# Patient Record
Sex: FEMALE | Race: ASIAN | NOT HISPANIC OR LATINO
[De-identification: names, ages, dates, MRNs, and addresses within clinical notes are randomized per-mention and may not be internally consistent; named-entity substitution may affect disease eponyms.]

---

## 2024-02-17 ENCOUNTER — NON-APPOINTMENT (OUTPATIENT)
Age: 49
End: 2024-02-17

## 2024-02-20 PROBLEM — Z00.00 ENCOUNTER FOR PREVENTIVE HEALTH EXAMINATION: Status: ACTIVE | Noted: 2024-02-20

## 2024-02-22 ENCOUNTER — APPOINTMENT (OUTPATIENT)
Dept: ORTHOPEDIC SURGERY | Facility: CLINIC | Age: 49
End: 2024-02-22
Payer: COMMERCIAL

## 2024-02-22 VITALS — HEIGHT: 63 IN | WEIGHT: 186 LBS | BODY MASS INDEX: 32.96 KG/M2

## 2024-02-22 DIAGNOSIS — I10 ESSENTIAL (PRIMARY) HYPERTENSION: ICD-10-CM

## 2024-02-22 DIAGNOSIS — M17.11 UNILATERAL PRIMARY OSTEOARTHRITIS, RIGHT KNEE: ICD-10-CM

## 2024-02-22 DIAGNOSIS — M79.18 MYALGIA, OTHER SITE: ICD-10-CM

## 2024-02-22 DIAGNOSIS — M71.21 SYNOVIAL CYST OF POPLITEAL SPACE [BAKER], RIGHT KNEE: ICD-10-CM

## 2024-02-22 PROCEDURE — 20610 DRAIN/INJ JOINT/BURSA W/O US: CPT | Mod: RT

## 2024-02-22 PROCEDURE — 73564 X-RAY EXAM KNEE 4 OR MORE: CPT | Mod: RT

## 2024-02-22 PROCEDURE — 99204 OFFICE O/P NEW MOD 45 MIN: CPT | Mod: 25

## 2024-02-22 PROCEDURE — J3490M: CUSTOM

## 2024-02-22 NOTE — ASSESSMENT
[FreeTextEntry1] : Right X-Ray Examination of the KNEE (4 views): medial and patellofemoral degenerate changes.   chronic arthritis with exab and poss meniscus tear and baker cyst rupture  - We discussed their diagnosis and treatment options at length including the risks and benefits of both surgical treatment with a knee replacement and non-surgical options. - Due to risks of surgery, we will continue conservative treatment with PT, icing, and anti-inflammatory medications - The patient was provided with a PT prescription to work on ROM, hip ER/abductors strengthening, quad/hamstring stretches and strengthening, and other exercises - The patient was advised to let pain guide the gradual advancement of activities. - We also discussed the possible of a corticosteroid injection in order to help decrease inflammation and pain so that they can perform better therapy. - The risks, benefits, and alternatives to corticosteroid injection were reviewed with the patient and they wished to proceed with this treatment course. - The patient was advised to apply ice (wrapped in a towel or protective covering) to the area daily (20 minutes at a time, 2-4X/day). - Follow up as needed in 6 weeks to re-evaluate, if no improvement we spoke about possibility of viscosupplementation injections

## 2024-02-22 NOTE — HISTORY OF PRESENT ILLNESS
[de-identified] : 48 year old female  ([facility]  )  chronic right knee pain intemittent since 2/10/2024 when going down stairs felt a pop and knee twisted The pain is located posterior, deep   The pain is associated with clicking, weakness, buckling  Worse with activity and better at rest. Has tried ice, Motrin, Volteran

## 2024-02-22 NOTE — IMAGING
[de-identified] :  RIGHT KNEE Inspection:  mild effusion, pop cyst Palpation: medial joint line tenderness, anterior tenderness Knee Range of Motion:  3-125  Strength: 5/5 Quadriceps strength, 5/5 Hamstring strength Neurological: light touch is intact throughout Ligament Stability and Special Tests:  McMurrays: neg Lachman: neg Pivot Shift: neg Posterior Drawer: neg Valgus: neg Varus: neg Patella Apprehension: neg Patella Maltracking: neg